# Patient Record
Sex: MALE | Race: BLACK OR AFRICAN AMERICAN | Employment: UNEMPLOYED | URBAN - METROPOLITAN AREA
[De-identification: names, ages, dates, MRNs, and addresses within clinical notes are randomized per-mention and may not be internally consistent; named-entity substitution may affect disease eponyms.]

---

## 2020-05-23 ENCOUNTER — HOSPITAL ENCOUNTER (EMERGENCY)
Age: 35
Discharge: HOME | End: 2020-05-23

## 2020-05-23 VITALS
TEMPERATURE: 98 F | OXYGEN SATURATION: 98 % | WEIGHT: 160 LBS | HEART RATE: 70 BPM | BODY MASS INDEX: 21.67 KG/M2 | HEIGHT: 72 IN | SYSTOLIC BLOOD PRESSURE: 124 MMHG | DIASTOLIC BLOOD PRESSURE: 82 MMHG | RESPIRATION RATE: 18 BRPM

## 2020-05-23 PROCEDURE — 6370000000 HC RX 637 (ALT 250 FOR IP): Performed by: NURSE PRACTITIONER

## 2020-05-23 PROCEDURE — 99282 EMERGENCY DEPT VISIT SF MDM: CPT

## 2020-05-23 RX ORDER — IBUPROFEN 800 MG/1
800 TABLET ORAL EVERY 8 HOURS PRN
Qty: 15 TABLET | Refills: 0 | Status: SHIPPED | OUTPATIENT
Start: 2020-05-23 | End: 2021-02-05

## 2020-05-23 RX ORDER — HYDROCODONE BITARTRATE AND ACETAMINOPHEN 5; 325 MG/1; MG/1
1 TABLET ORAL ONCE
Status: COMPLETED | OUTPATIENT
Start: 2020-05-23 | End: 2020-05-23

## 2020-05-23 RX ORDER — CHLORHEXIDINE GLUCONATE 0.12 MG/ML
15 RINSE ORAL 2 TIMES DAILY
Qty: 473 ML | Refills: 0 | Status: SHIPPED | OUTPATIENT
Start: 2020-05-23 | End: 2021-02-05

## 2020-05-23 RX ORDER — AMOXICILLIN AND CLAVULANATE POTASSIUM 875; 125 MG/1; MG/1
1 TABLET, FILM COATED ORAL 2 TIMES DAILY
Qty: 20 TABLET | Refills: 0 | Status: SHIPPED | OUTPATIENT
Start: 2020-05-23 | End: 2020-06-02

## 2020-05-23 RX ADMIN — HYDROCODONE BITARTRATE AND ACETAMINOPHEN 1 TABLET: 5; 325 TABLET ORAL at 18:06

## 2020-05-23 ASSESSMENT — PAIN SCALES - GENERAL
PAINLEVEL_OUTOF10: 9
PAINLEVEL_OUTOF10: 9

## 2020-05-23 ASSESSMENT — PAIN DESCRIPTION - PAIN TYPE: TYPE: ACUTE PAIN

## 2020-05-23 NOTE — ED PROVIDER NOTES
age.  · HEENT:  NC/NT. Airway patent. · Neck:  Supple. Normal ROM. · Lips:  upper and lower normal.  · Mouth:  normal tongue and buccal mucosa. Floor of tongue soft. · Dental:  #30 tender to percussion with partial lateral crack filling intact. Surrounding gum tender. No obvious signs of abscess. Trismus: No.         Drooling: No.           Airway stridor: No.  · Facial skin: right no erythema, rash or swelling noted. · Respiratory:  Clear to auscultation and breath sounds equal.    · CV: Regular rate and rhythm, normal heart sounds, without pathological murmurs, ectopy, gallops, or rubs. · Skin:  No rashes, erythema or lesions present, unless noted elsewhere. .  · Lymphatics: No lymphangitis or adenopathy noted. · Neurological:  Oriented. Motor functions intact. Lab / Imaging Results   (All laboratory and radiology results have been personally reviewed by myself)  Labs:  No results found for this visit on 05/23/20. Imaging: All Radiology results interpreted by Radiologist unless otherwise noted. No orders to display     ED Course / Medical Decision Making     Medications   HYDROcodone-acetaminophen (NORCO) 5-325 MG per tablet 1 tablet (1 tablet Oral Given 5/23/20 1806)        Consult(s):   Dental Resident was not consulted to see patient regarding complaint. Procedure(s):    none. Counseling/MDM:    Valerie Cruz is a 36year old male who presented to the ED for complaint of dental pain for the past 3-4 days. No fever or chills. No concerns for systemic infection. No concerns for Rogelio Angina. He was medicated for pain. Given contact information for dental clinic. He was prescribed Augmentin, Peridex, and Motrin. He remained hemodynamically stable, nontoxic and appropriate for outpatient management. Advised to return for any new, changing, worsening symptoms or concerns.    At this time the patient is without objective evidence of an acute process requiring

## 2021-02-05 ENCOUNTER — OFFICE VISIT (OUTPATIENT)
Dept: PRIMARY CARE CLINIC | Age: 36
End: 2021-02-05

## 2021-02-05 VITALS
OXYGEN SATURATION: 96 % | HEIGHT: 72 IN | SYSTOLIC BLOOD PRESSURE: 130 MMHG | TEMPERATURE: 97.6 F | WEIGHT: 160 LBS | DIASTOLIC BLOOD PRESSURE: 82 MMHG | HEART RATE: 80 BPM | BODY MASS INDEX: 21.67 KG/M2

## 2021-02-05 DIAGNOSIS — Z20.822 ENCOUNTER FOR SCREENING LABORATORY TESTING FOR COVID-19 VIRUS IN ASYMPTOMATIC PATIENT: ICD-10-CM

## 2021-02-05 DIAGNOSIS — Z20.822 ENCOUNTER FOR SCREENING LABORATORY TESTING FOR COVID-19 VIRUS IN ASYMPTOMATIC PATIENT: Primary | ICD-10-CM

## 2021-02-05 PROCEDURE — 99213 OFFICE O/P EST LOW 20 MIN: CPT | Performed by: NURSE PRACTITIONER

## 2021-02-05 NOTE — PROGRESS NOTES
Chief Complaint   Covid Testing (needs covid test after travel to )      History of Present Illness   Source of history provided by: patient. Milvia Zazueta is a 30 y.o. old male who presents to the flu clinic for evaluation and COVID testing. Patient reports he is traveling back and forth to [place] as he is moving there and this is more for peace of mind for his family so he does not have to wear a mask around them 24 hours a day. The patient is currently asymptomatic and feeling well. Denies any fever, chills, CP, dyspnea, LE edema, abdominal pain, vomiting, rash, or lethargy. Denies any hx of asthma, recurrent bronchitis, COPD, pneumonia. Denies any tobacco use. ROS   Pertinent positives and negatives are stated within HPI, all other systems reviewed and are negative. Past Medical History:  has a past medical history of Kidney stone. Surgical History:  has no past surgical history on file. Social History:  reports that he has never smoked. He has never used smokeless tobacco. He reports previous alcohol use. He reports that he does not use drugs. Family History: family history is not on file. Allergies: Patient has no known allergies. Physical Exam   VS:  /82   Pulse 80   Temp 97.6 °F (36.4 °C)   Ht 6' (1.829 m)   Wt 160 lb (72.6 kg)   SpO2 96%   BMI 21.70 kg/m²    Oxygen Saturation Interpretation: Normal.    Constitutional:  Alert, development consistent with age. NAD. Head:  NC/NT. Airway patent. No TTP over maxillary and frontal sinuses. Mouth: Posterior pharynx clear with no erythema present. No tonsillar hypertrophy or exudate. Neck:  Normal ROM. Supple. No anterior cervical adenopathy noted. Lungs: CTAB without wheezes, rales, or rhonchi. CV:  Regular rate and rhythm, normal heart sounds, without pathological murmurs, ectopy, gallops, or rubs. Skin:  Normal turgor. Warm, dry, without visible rash.   Lymphatic: No lymphangitis or adenopathy noted.  Neurological:  Oriented. Motor functions intact. Lab / Imaging Results   (All laboratory and radiology results have been personally reviewed by myself)  Labs:  No results found for this visit on 02/05/21. Imaging: All Radiology results interpreted by Radiologist unless otherwise noted. No results found. Medical Decision Making   Pt non-toxic, in no apparent distress and stable at time of discharge. Assessment/Plan   Ruben was seen today for covid testing. Diagnoses and all orders for this visit:    Encounter for screening laboratory testing for COVID-19 virus in asymptomatic patient  -     COVID-19 Ambulatory; Future    COVID-19 swab obtained and pending, will call with results once available. If testing is positive, pt should remain out of work/public for at least 10 days from test date. Schedule virtual f/u with PCP in 7-10 days if symptoms present. ED immediately with high or refractory fever, progressive SOB, dyspnea, CP, calf pain/swelling, shaking chills, vomiting, abdominal pain, lethargy, flank pain, or decreased urinary output. Pt verbalizes understanding and is in agreement with plan of care. All questions answered. Ira Kaiser, APRN - CNP    This visit was provided as a focused evaluation during the COVID -19 pandemic/national emergency. A comprehensive review of all previous patient history and testing was not conducted. Pertinent findings were elicited during the visit. *NOTE: This report was transcribed using voice recognition software. Every effort was made to ensure accuracy; however, inadvertent computerized transcription errors may be present.

## 2021-02-07 LAB
SARS-COV-2: NOT DETECTED
SOURCE: NORMAL